# Patient Record
Sex: FEMALE | Race: WHITE | URBAN - METROPOLITAN AREA
[De-identification: names, ages, dates, MRNs, and addresses within clinical notes are randomized per-mention and may not be internally consistent; named-entity substitution may affect disease eponyms.]

---
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
## 2024-01-07 ENCOUNTER — OFFICE VISIT (OUTPATIENT)
Age: 19
End: 2024-01-07

## 2024-01-07 VITALS
OXYGEN SATURATION: 97 % | HEART RATE: 113 BPM | TEMPERATURE: 97.7 F | WEIGHT: 125.4 LBS | SYSTOLIC BLOOD PRESSURE: 108 MMHG | BODY MASS INDEX: 20.89 KG/M2 | HEIGHT: 65 IN | DIASTOLIC BLOOD PRESSURE: 71 MMHG

## 2024-01-07 DIAGNOSIS — Z02.0 SCHOOL PHYSICAL EXAM: Primary | ICD-10-CM

## 2024-02-02 ENCOUNTER — OFFICE VISIT (OUTPATIENT)
Dept: FAMILY MEDICINE CLINIC | Age: 19
End: 2024-02-02

## 2024-02-02 VITALS
WEIGHT: 119 LBS | HEIGHT: 65 IN | HEART RATE: 98 BPM | BODY MASS INDEX: 19.83 KG/M2 | TEMPERATURE: 97.1 F | OXYGEN SATURATION: 97 % | DIASTOLIC BLOOD PRESSURE: 76 MMHG | SYSTOLIC BLOOD PRESSURE: 122 MMHG

## 2024-02-02 DIAGNOSIS — Z00.01 ENCOUNTER FOR ROUTINE ADULT HEALTH EXAMINATION WITH ABNORMAL FINDINGS: Primary | ICD-10-CM

## 2024-02-02 DIAGNOSIS — N20.0 NEPHROLITHIASIS: ICD-10-CM

## 2024-02-02 DIAGNOSIS — E55.9 VITAMIN D DEFICIENCY: ICD-10-CM

## 2024-02-02 DIAGNOSIS — E53.8 B12 DEFICIENCY: ICD-10-CM

## 2024-02-02 PROBLEM — F41.9 ANXIETY: Status: ACTIVE | Noted: 2021-01-06

## 2024-02-02 PROBLEM — M54.50 CHRONIC BILATERAL LOW BACK PAIN WITHOUT SCIATICA: Status: ACTIVE | Noted: 2020-09-25

## 2024-02-02 PROBLEM — G89.29 CHRONIC BILATERAL LOW BACK PAIN WITHOUT SCIATICA: Status: ACTIVE | Noted: 2020-09-25

## 2024-02-02 PROBLEM — K21.9 GASTROESOPHAGEAL REFLUX DISEASE: Status: ACTIVE | Noted: 2021-08-02

## 2024-02-02 PROBLEM — K31.84 GASTROPARESIS: Status: ACTIVE | Noted: 2021-03-11

## 2024-02-02 SDOH — ECONOMIC STABILITY: HOUSING INSECURITY
IN THE LAST 12 MONTHS, WAS THERE A TIME WHEN YOU DID NOT HAVE A STEADY PLACE TO SLEEP OR SLEPT IN A SHELTER (INCLUDING NOW)?: NO

## 2024-02-02 SDOH — ECONOMIC STABILITY: FOOD INSECURITY: WITHIN THE PAST 12 MONTHS, THE FOOD YOU BOUGHT JUST DIDN'T LAST AND YOU DIDN'T HAVE MONEY TO GET MORE.: NEVER TRUE

## 2024-02-02 SDOH — ECONOMIC STABILITY: INCOME INSECURITY: HOW HARD IS IT FOR YOU TO PAY FOR THE VERY BASICS LIKE FOOD, HOUSING, MEDICAL CARE, AND HEATING?: NOT HARD AT ALL

## 2024-02-02 ASSESSMENT — PATIENT HEALTH QUESTIONNAIRE - PHQ9
2. FEELING DOWN, DEPRESSED OR HOPELESS: 0
1. LITTLE INTEREST OR PLEASURE IN DOING THINGS: 0
SUM OF ALL RESPONSES TO PHQ QUESTIONS 1-9: 0
SUM OF ALL RESPONSES TO PHQ QUESTIONS 1-9: 0
SUM OF ALL RESPONSES TO PHQ9 QUESTIONS 1 & 2: 0
SUM OF ALL RESPONSES TO PHQ QUESTIONS 1-9: 0
SUM OF ALL RESPONSES TO PHQ QUESTIONS 1-9: 0

## 2024-02-02 NOTE — PROGRESS NOTES
Chief Complaint   Patient presents with    Kidney Problem     Going on since end of December, had 3 kidney stones since then        SUBJECTIVE:   Miracle Tillman is a 18 y.o. female presenting for an annual checkup.      HPI:   Kidney pain- Recurrent nephrolithiasis- has passed 3 kidney stones since dec. Has had recurrent since age 13yrs. Gets discomfort and achiness when passing them. Saw urology as sophomore in . Hx of 10 stones in kidneys and had lithotripsy. Seemed to cause GERD and they had hoped it would clear up GERD.  She is now a sophomore at .  Unsure if she's having kidney stone. Had Rt kidney ache/pain on sun-tues on and of since then. Didn't pass anything noticeable but may have?   Labs reviewed in dec 2023  B12 and D low - not vegan/vegetarian.  Here with mom.    SH - She is now a sophomore at     Patient Active Problem List   Diagnosis    MARÍA (generalized anxiety disorder)    Nephrolithiasis    Gilbert's disease    Chronic constipation    Tension headache    Gastroparesis    Gastroesophageal reflux disease    Chronic bilateral low back pain without sciatica    Anxiety     History reviewed. No pertinent past medical history.  History reviewed. No pertinent surgical history.  Social History     Socioeconomic History    Marital status: Single     Spouse name: None    Number of children: None    Years of education: None    Highest education level: None   Tobacco Use    Smoking status: Never     Passive exposure: Never    Smokeless tobacco: Never   Substance and Sexual Activity    Alcohol use: Never    Drug use: Never     Social Determinants of Health     Financial Resource Strain: Low Risk  (2/2/2024)    Overall Financial Resource Strain (CARDIA)     Difficulty of Paying Living Expenses: Not hard at all   Food Insecurity: No Food Insecurity (2/2/2024)    Hunger Vital Sign     Worried About Running Out of Food in the Last Year: Never true     Ran Out of Food in the Last Year: Never true

## 2024-03-21 ENCOUNTER — PATIENT MESSAGE (OUTPATIENT)
Dept: FAMILY MEDICINE CLINIC | Age: 19
End: 2024-03-21

## 2024-03-21 DIAGNOSIS — F41.1 GAD (GENERALIZED ANXIETY DISORDER): Primary | ICD-10-CM

## 2024-03-22 RX ORDER — FLUOXETINE HYDROCHLORIDE 20 MG/1
20 CAPSULE ORAL DAILY
Qty: 30 CAPSULE | Refills: 3 | Status: SHIPPED | OUTPATIENT
Start: 2024-03-22

## 2024-08-21 DIAGNOSIS — F41.1 GAD (GENERALIZED ANXIETY DISORDER): ICD-10-CM

## 2024-08-21 RX ORDER — FLUOXETINE HYDROCHLORIDE 20 MG/1
20 CAPSULE ORAL DAILY
Qty: 30 CAPSULE | Refills: 5 | Status: SHIPPED | OUTPATIENT
Start: 2024-08-21

## 2024-08-21 NOTE — TELEPHONE ENCOUNTER
Medication:   Requested Prescriptions     Pending Prescriptions Disp Refills    FLUoxetine (PROZAC) 20 MG capsule [Pharmacy Med Name: FLUOXETINE HCL 20 MG CAPSULE] 30 capsule 3     Sig: TAKE 1 CAPSULE BY MOUTH DAILY        Last Filled:      Patient Phone Number: [phone] (home)     Last appt: 2/2/2024   Next appt: Visit date not found    Last OARRS:        No data to display

## 2024-09-03 DIAGNOSIS — F41.1 GAD (GENERALIZED ANXIETY DISORDER): ICD-10-CM

## 2024-09-04 RX ORDER — FLUOXETINE 40 MG/1
40 CAPSULE ORAL DAILY
Qty: 90 CAPSULE | Refills: 3 | Status: SHIPPED | OUTPATIENT
Start: 2024-09-04

## 2024-09-04 NOTE — TELEPHONE ENCOUNTER
Medication:   Requested Prescriptions     Pending Prescriptions Disp Refills    FLUoxetine (PROZAC) 40 MG capsule 90 capsule 0     Sig: Take 1 capsule by mouth daily        Last Filled:  08/21/2024 #30 5rf: Prozac 20mg     Patient Phone Number: [phone] (home)     Last appt: 2/2/2024   Next appt: Visit date not found    Last OARRS:        No data to display

## 2024-09-12 ENCOUNTER — OFFICE VISIT (OUTPATIENT)
Dept: FAMILY MEDICINE CLINIC | Age: 19
End: 2024-09-12
Payer: COMMERCIAL

## 2024-09-12 VITALS
RESPIRATION RATE: 16 BRPM | HEIGHT: 65 IN | BODY MASS INDEX: 20.23 KG/M2 | DIASTOLIC BLOOD PRESSURE: 78 MMHG | SYSTOLIC BLOOD PRESSURE: 96 MMHG | WEIGHT: 121.4 LBS | HEART RATE: 101 BPM | OXYGEN SATURATION: 99 % | TEMPERATURE: 97.2 F

## 2024-09-12 DIAGNOSIS — E53.8 B12 DEFICIENCY: ICD-10-CM

## 2024-09-12 DIAGNOSIS — Z11.9 SCREENING EXAMINATION FOR INFECTIOUS DISEASE: ICD-10-CM

## 2024-09-12 DIAGNOSIS — Z11.9 SCREENING EXAMINATION FOR INFECTIOUS DISEASE: Primary | ICD-10-CM

## 2024-09-12 DIAGNOSIS — E55.9 VITAMIN D DEFICIENCY: ICD-10-CM

## 2024-09-12 PROCEDURE — 99213 OFFICE O/P EST LOW 20 MIN: CPT | Performed by: FAMILY MEDICINE

## 2024-09-13 LAB
25(OH)D3 SERPL-MCNC: 35.1 NG/ML
HBV SURFACE AB SERPL IA-ACNC: <3.5 MIU/ML
HBV SURFACE AG SERPL QL IA: NORMAL
VIT B12 SERPL-MCNC: 746 PG/ML (ref 211–911)
VZV IGG SER QL IA: NORMAL

## 2024-09-16 LAB
GAMMA INTERFERON BACKGROUND BLD IA-ACNC: 0.01 IU/ML
M TB IFN-G BLD-IMP: NEGATIVE
M TB IFN-G CD4+ BCKGRND COR BLD-ACNC: 0.01 IU/ML
M TB IFN-G CD4+CD8+ BCKGRND COR BLD-ACNC: 0.03 IU/ML
MITOGEN IGNF BCKGRD COR BLD-ACNC: 9.99 IU/ML

## 2024-10-18 ENCOUNTER — TELEPHONE (OUTPATIENT)
Dept: FAMILY MEDICINE CLINIC | Age: 19
End: 2024-10-18

## 2024-10-18 NOTE — TELEPHONE ENCOUNTER
Pt was seen at urgent care and prescribed a 3 day antibiotic called sulfamethoxazole . She was only given 3 tablets and threw up one causing her to run short. Can a script be sent in for this?    Please advise and thank you.

## 2024-10-22 ENCOUNTER — PATIENT MESSAGE (OUTPATIENT)
Dept: FAMILY MEDICINE CLINIC | Age: 19
End: 2024-10-22

## 2024-10-22 NOTE — TELEPHONE ENCOUNTER
Patient scheduled.    Recent Visits  Date Type Provider Dept   09/12/24 Office Visit Juanita Landry MD [facility]   02/02/24 Office Visit Juanita Landry MD [facility]   Showing recent visits within past 540 days with a meds authorizing provider and meeting all other requirements  Future Appointments  Date Type Provider Dept   10/23/24 Appointment Juanita Landry MD [facility]   Showing future appointments within next 150 days with a meds authorizing provider and meeting all other requirements

## 2024-10-23 ENCOUNTER — OFFICE VISIT (OUTPATIENT)
Dept: FAMILY MEDICINE CLINIC | Age: 19
End: 2024-10-23
Payer: COMMERCIAL

## 2024-10-23 VITALS
TEMPERATURE: 97.2 F | DIASTOLIC BLOOD PRESSURE: 68 MMHG | HEART RATE: 94 BPM | BODY MASS INDEX: 20.27 KG/M2 | SYSTOLIC BLOOD PRESSURE: 90 MMHG | WEIGHT: 121.8 LBS | OXYGEN SATURATION: 96 % | RESPIRATION RATE: 16 BRPM

## 2024-10-23 DIAGNOSIS — J18.9 PNEUMONIA OF RIGHT MIDDLE LOBE DUE TO INFECTIOUS ORGANISM: Primary | ICD-10-CM

## 2024-10-23 DIAGNOSIS — N39.0 URINARY TRACT INFECTION WITHOUT HEMATURIA, SITE UNSPECIFIED: ICD-10-CM

## 2024-10-23 LAB
BILIRUBIN, POC: NORMAL
BLOOD URINE, POC: NORMAL
CLARITY, POC: NORMAL
COLOR, POC: YELLOW
GLUCOSE URINE, POC: NORMAL MG/DL
KETONES, POC: NORMAL MG/DL
LEUKOCYTE EST, POC: NORMAL
NITRITE, POC: NORMAL
PH, POC: 6
PROTEIN, POC: NORMAL MG/DL
SPECIFIC GRAVITY, POC: 1.03
UROBILINOGEN, POC: 1 MG/DL

## 2024-10-23 PROCEDURE — 81002 URINALYSIS NONAUTO W/O SCOPE: CPT | Performed by: FAMILY MEDICINE

## 2024-10-23 PROCEDURE — 99213 OFFICE O/P EST LOW 20 MIN: CPT | Performed by: FAMILY MEDICINE

## 2024-10-23 RX ORDER — SULFAMETHOXAZOLE AND TRIMETHOPRIM 800; 160 MG/1; MG/1
1 TABLET ORAL 2 TIMES DAILY
COMMUNITY
Start: 2024-10-17

## 2024-10-23 RX ORDER — DOXYCYCLINE 100 MG/1
100 CAPSULE ORAL 2 TIMES DAILY
COMMUNITY
Start: 2024-10-19

## 2024-10-23 NOTE — PROGRESS NOTES
Chief complaint: Urinary Tract Infection and ED Follow-up (Dx w/ pneumonia )      SUBJECTIVE:  HPI  Miracle Tillman (:  2005) is a 18 y.o. female who presents with a chief complaint of:cough, chest pain, kidney pain on the right. Some discomfort with peeing. Last fever on Monday to 103F, but this was the last.    Chest feels heavy with breathing. Also hurts with coughing. Fever was up to 104F. Being upright is a big improvement. Here with mom  ER did US of bladder to make sure it was emptying. It was 0 postvoid residual per pt report  Pt needs note for work, school, and clinicals    Patient Active Problem List   Diagnosis    MARÍA (generalized anxiety disorder)    Nephrolithiasis    Gilbert's disease    Chronic constipation    Tension headache    Gastroparesis    Gastroesophageal reflux disease    Chronic bilateral low back pain without sciatica    Anxiety     No past medical history on file.  Current Outpatient Medications on File Prior to Visit   Medication Sig Dispense Refill    doxycycline hyclate (VIBRAMYCIN) 100 MG capsule Take 1 capsule by mouth 2 times daily      sulfamethoxazole-trimethoprim (BACTRIM DS;SEPTRA DS) 800-160 MG per tablet Take 1 tablet by mouth 2 times daily      FLUoxetine (PROZAC) 40 MG capsule Take 1 capsule by mouth daily 90 capsule 3    Melatonin-Pyridoxine (MELATIN PO) Take by mouth       No current facility-administered medications on file prior to visit.       OBJECTIVE:  BP 90/68   Pulse 94   Temp 97.2 °F (36.2 °C) (Temporal)   Resp 16   Wt 55.2 kg (121 lb 12.8 oz)   LMP 2024 (Approximate)   SpO2 96%   BMI 20.27 kg/m²      Physical exam:  afebrile, vitals reviewed  Gen:  WD, WN, NAD, A&Ox3, pleasant  Eyes:  Sclerae clear  Neck:  Supple, No cervical or submandibular LAD. No obvious thyromegaly.  Heart:  RRR, no murmur, rubs, gallops  Lungs:  CTAB, crackles in RML  Abd:  soft, NT/ND, no CVAT  Skin: No obvious rashes    ASSESSMENT/PLAN:  1. Pneumonia of right

## 2024-10-24 LAB — BACTERIA UR CULT: NORMAL

## 2024-11-25 ENCOUNTER — OFFICE VISIT (OUTPATIENT)
Age: 19
End: 2024-11-25

## 2024-11-25 VITALS
SYSTOLIC BLOOD PRESSURE: 110 MMHG | WEIGHT: 122.6 LBS | BODY MASS INDEX: 20.4 KG/M2 | OXYGEN SATURATION: 97 % | TEMPERATURE: 98.3 F | DIASTOLIC BLOOD PRESSURE: 80 MMHG | HEART RATE: 88 BPM

## 2024-11-25 DIAGNOSIS — J01.80 ACUTE NON-RECURRENT SINUSITIS OF OTHER SINUS: Primary | ICD-10-CM

## 2024-11-25 RX ORDER — FLUTICASONE PROPIONATE 50 MCG
2 SPRAY, SUSPENSION (ML) NASAL DAILY
Qty: 16 G | Refills: 0 | Status: SHIPPED | OUTPATIENT
Start: 2024-11-25

## 2024-11-25 ASSESSMENT — ENCOUNTER SYMPTOMS
VOMITING: 0
NAUSEA: 0
SORE THROAT: 1
RHINORRHEA: 1
DIARRHEA: 0
COUGH: 1
SHORTNESS OF BREATH: 1

## 2024-11-25 NOTE — PROGRESS NOTES
Miracle Tillman (:  2005) is a 19 y.o. female,Established patient, here for evaluation of the following chief complaint(s):  Sinusitis (Pressure , sore throat , HA, congestion x 2 days ) and Pharyngitis (X 1 week )      ASSESSMENT/PLAN:    ICD-10-CM    1. Acute non-recurrent sinusitis of other sinus  J01.80 amoxicillin-clavulanate (AUGMENTIN) 875-125 MG per tablet     fluticasone (FLONASE) 50 MCG/ACT nasal spray        Patient is experiencing bacterial sinusitis. This will be treated with Augmentin. Encouraged patient to increase fluids, use warm steamy showers and warm moist compresses, Flonase, and antibiotic as prescribed. Return for worsening or persistent symptoms. They are understanding and agreeable to this plan.     Dx Disposition: URI, allergies, COVID  Education and handout provided on diagnosis and management of symptoms.   AVS reviewed with patient. Follow up as needed in UC or with PCP for new or worsening symptoms.   Return if symptoms worsen or fail to improve.    SUBJECTIVE/OBJECTIVE:  Patient presents to the clinic with complaints of left sinus congestion, neck pain, sore throat, runny nose/congestion. This started a couple of weeks ago. Denies any fever or body aches. She hasn't taken anything for her symptoms.        History provided by:  Patient   used: No    Sinusitis  Associated symptoms include congestion, coughing, ear pain, headaches, shortness of breath and a sore throat. Pertinent negatives include no chills.   Pharyngitis  Associated symptoms: congestion, cough, ear pain, headaches, rhinorrhea, shortness of breath and sore throat    Associated symptoms: no diarrhea, no fatigue, no fever, no myalgias, no nausea and no vomiting        Vitals:    24 1622   BP: 110/80   Site: Left Upper Arm   Position: Sitting   Cuff Size: Medium Adult   Pulse: 88   Temp: 98.3 °F (36.8 °C)   TempSrc: Oral   SpO2: 97%   Weight: 55.6 kg (122 lb 9.6 oz)       Review of

## 2024-11-27 ENCOUNTER — HOSPITAL ENCOUNTER (OUTPATIENT)
Dept: GENERAL RADIOLOGY | Age: 19
Discharge: HOME | End: 2024-11-27
Payer: COMMERCIAL

## 2024-11-27 DIAGNOSIS — J18.9 PNEUMONIA OF RIGHT MIDDLE LOBE DUE TO INFECTIOUS ORGANISM: ICD-10-CM

## 2024-11-27 PROCEDURE — 71046 X-RAY EXAM CHEST 2 VIEWS: CPT

## 2025-07-22 ENCOUNTER — OFFICE VISIT (OUTPATIENT)
Dept: FAMILY MEDICINE CLINIC | Age: 20
End: 2025-07-22
Payer: COMMERCIAL

## 2025-07-22 VITALS
SYSTOLIC BLOOD PRESSURE: 112 MMHG | TEMPERATURE: 97.1 F | HEART RATE: 90 BPM | HEIGHT: 65 IN | OXYGEN SATURATION: 99 % | BODY MASS INDEX: 20.16 KG/M2 | DIASTOLIC BLOOD PRESSURE: 72 MMHG | WEIGHT: 121 LBS

## 2025-07-22 DIAGNOSIS — R30.0 DYSURIA: ICD-10-CM

## 2025-07-22 DIAGNOSIS — B96.89 ACUTE BACTERIAL SIMPLE CYSTITIS: Primary | ICD-10-CM

## 2025-07-22 DIAGNOSIS — N30.80 ACUTE BACTERIAL SIMPLE CYSTITIS: Primary | ICD-10-CM

## 2025-07-22 LAB
BILIRUBIN, POC: NEGATIVE
BLOOD URINE, POC: NORMAL
CLARITY, POC: NORMAL
COLOR, POC: NORMAL
GLUCOSE URINE, POC: NEGATIVE MG/DL
KETONES, POC: NEGATIVE MG/DL
LEUKOCYTE EST, POC: NEGATIVE
NITRITE, POC: NEGATIVE
PH, POC: 6.5
PROTEIN, POC: NORMAL MG/DL
SPECIFIC GRAVITY, POC: 1.02
UROBILINOGEN, POC: 0.2 MG/DL

## 2025-07-22 PROCEDURE — 99213 OFFICE O/P EST LOW 20 MIN: CPT | Performed by: FAMILY MEDICINE

## 2025-07-22 PROCEDURE — 81002 URINALYSIS NONAUTO W/O SCOPE: CPT | Performed by: FAMILY MEDICINE

## 2025-07-22 RX ORDER — PHENAZOPYRIDINE HYDROCHLORIDE 200 MG/1
200 TABLET, FILM COATED ORAL 3 TIMES DAILY PRN
Qty: 10 TABLET | Refills: 0 | Status: SHIPPED | OUTPATIENT
Start: 2025-07-22 | End: 2025-07-25

## 2025-07-22 RX ORDER — NITROFURANTOIN 25; 75 MG/1; MG/1
100 CAPSULE ORAL 2 TIMES DAILY
Qty: 10 CAPSULE | Refills: 0 | Status: SHIPPED | OUTPATIENT
Start: 2025-07-22 | End: 2025-07-27

## 2025-07-22 SDOH — ECONOMIC STABILITY: FOOD INSECURITY: WITHIN THE PAST 12 MONTHS, THE FOOD YOU BOUGHT JUST DIDN'T LAST AND YOU DIDN'T HAVE MONEY TO GET MORE.: NEVER TRUE

## 2025-07-22 SDOH — ECONOMIC STABILITY: FOOD INSECURITY: WITHIN THE PAST 12 MONTHS, YOU WORRIED THAT YOUR FOOD WOULD RUN OUT BEFORE YOU GOT MONEY TO BUY MORE.: NEVER TRUE

## 2025-07-22 ASSESSMENT — PATIENT HEALTH QUESTIONNAIRE - PHQ9
SUM OF ALL RESPONSES TO PHQ QUESTIONS 1-9: 0
2. FEELING DOWN, DEPRESSED OR HOPELESS: NOT AT ALL
1. LITTLE INTEREST OR PLEASURE IN DOING THINGS: NOT AT ALL
SUM OF ALL RESPONSES TO PHQ QUESTIONS 1-9: 0

## 2025-07-22 NOTE — PROGRESS NOTES
Chief complaint: Abdominal Pain (Started yesterday; cramping type  in lower abdomen and urethra  has a constant dull ache and  was burning earlier)      SUBJECTIVE:  HPI  Miracle Tillman (:  2005) is a 19 y.o. female who presents with a chief complaint of: suprapubic pain this morning (Had urethra pain and bladder pain this morning as well). It started yesterday slightly to the right. Had pain with peeing one time yesterday but find the rest of the day. Has a constant dull ache in the lower middle  Large blood on UA. Not on period. LMP: 25  No vaginal discharge or odor  Tried to go to clinicals, got bad so went to urgent care but felt worse than UC. But then it was worse, so started driving to ER but then went away for about an hour. Hurt like a kidney stone would but in a different place. Usually throws up from the pain when having kidney stones about this didn't hurt that bad    Has had kidney stones before - bonita like that. But not as bad but still really bad  [place] ?UTI. Has had one before. Usually has bladder pain for 5-10min after peeing, but doesn't have today or this time at all.  Not as bad as a kidney stone  Has had lithotripsy and that made urethra hurt  Not sexually active      Patient Active Problem List   Diagnosis    MARÍA (generalized anxiety disorder)    Nephrolithiasis    Gilbert's disease    Chronic constipation    Tension headache    Gastroparesis    Gastroesophageal reflux disease    Chronic bilateral low back pain without sciatica    Anxiety     No past medical history on file.  Current Outpatient Medications on File Prior to Visit   Medication Sig Dispense Refill    fluticasone (FLONASE) 50 MCG/ACT nasal spray 2 sprays by Each Nostril route daily 16 g 0    FLUoxetine (PROZAC) 40 MG capsule Take 1 capsule by mouth daily 90 capsule 3    Melatonin-Pyridoxine (MELATIN PO) Take by mouth       No current facility-administered medications on file prior to

## 2025-07-24 LAB — BACTERIA UR CULT: NORMAL
